# Patient Record
Sex: FEMALE | Race: WHITE
[De-identification: names, ages, dates, MRNs, and addresses within clinical notes are randomized per-mention and may not be internally consistent; named-entity substitution may affect disease eponyms.]

---

## 2018-01-25 ENCOUNTER — HOSPITAL ENCOUNTER (EMERGENCY)
Dept: HOSPITAL 45 - C.EDB | Age: 28
LOS: 1 days | Discharge: HOME | End: 2018-01-26
Payer: SELF-PAY

## 2018-01-25 VITALS
BODY MASS INDEX: 28.43 KG/M2 | HEIGHT: 60.98 IN | WEIGHT: 150.58 LBS | HEIGHT: 60.98 IN | WEIGHT: 150.58 LBS | BODY MASS INDEX: 28.43 KG/M2

## 2018-01-25 VITALS — TEMPERATURE: 98.6 F

## 2018-01-25 DIAGNOSIS — N30.00: ICD-10-CM

## 2018-01-25 DIAGNOSIS — R11.10: Primary | ICD-10-CM

## 2018-01-25 DIAGNOSIS — F17.200: ICD-10-CM

## 2018-01-25 DIAGNOSIS — Z3A.15: ICD-10-CM

## 2018-01-25 LAB
ALBUMIN SERPL-MCNC: 3.2 GM/DL (ref 3.4–5)
ALP SERPL-CCNC: 49 U/L (ref 45–117)
ALT SERPL-CCNC: 16 U/L (ref 12–78)
AST SERPL-CCNC: 9 U/L (ref 15–37)
BASOPHILS # BLD: 0.01 K/UL (ref 0–0.2)
BASOPHILS NFR BLD: 0.1 %
BUN SERPL-MCNC: 13 MG/DL (ref 7–18)
CALCIUM SERPL-MCNC: 9.2 MG/DL (ref 8.5–10.1)
CO2 SERPL-SCNC: 22 MMOL/L (ref 21–32)
CREAT SERPL-MCNC: 0.44 MG/DL (ref 0.6–1.2)
EOS ABS #: 0.08 K/UL (ref 0–0.5)
EOSINOPHIL NFR BLD AUTO: 236 K/UL (ref 130–400)
GLUCOSE SERPL-MCNC: 78 MG/DL (ref 70–99)
HCT VFR BLD CALC: 36.8 % (ref 37–47)
HGB BLD-MCNC: 13 G/DL (ref 12–16)
IG#: 0.06 K/UL (ref 0–0.02)
IMM GRANULOCYTES NFR BLD AUTO: 20.4 %
INR PPP: 0.9 (ref 0.9–1.1)
LYMPHOCYTES # BLD: 2.52 K/UL (ref 1.2–3.4)
MCH RBC QN AUTO: 31.6 PG (ref 25–34)
MCHC RBC AUTO-ENTMCNC: 35.3 G/DL (ref 32–36)
MCV RBC AUTO: 89.3 FL (ref 80–100)
MONO ABS #: 0.43 K/UL (ref 0.11–0.59)
MONOCYTES NFR BLD: 3.5 %
NEUT ABS #: 9.24 K/UL (ref 1.4–6.5)
NEUTROPHILS # BLD AUTO: 0.6 %
NEUTROPHILS NFR BLD AUTO: 74.9 %
PMV BLD AUTO: 9.6 FL (ref 7.4–10.4)
POTASSIUM SERPL-SCNC: 3.7 MMOL/L (ref 3.5–5.1)
PROT SERPL-MCNC: 7 GM/DL (ref 6.4–8.2)
PTT PATIENT: 27.5 SECONDS (ref 21–31)
RED CELL DISTRIBUTION WIDTH CV: 13.3 % (ref 11.5–14.5)
RED CELL DISTRIBUTION WIDTH SD: 43.5 FL (ref 36.4–46.3)
SODIUM SERPL-SCNC: 133 MMOL/L (ref 136–145)
WBC # BLD AUTO: 12.34 K/UL (ref 4.8–10.8)

## 2018-01-26 VITALS — DIASTOLIC BLOOD PRESSURE: 57 MMHG | OXYGEN SATURATION: 97 % | SYSTOLIC BLOOD PRESSURE: 103 MMHG | HEART RATE: 79 BPM

## 2018-01-26 VITALS — OXYGEN SATURATION: 97 %

## 2018-01-26 NOTE — DIAGNOSTIC IMAGING REPORT
ULTRASOUND FETAL LIMITED



CLINICAL HISTORY: Pregnant. Cramping.



COMPARISON STUDY: No priors.



FINDINGS: Real-time, grayscale, and color Doppler sonography of the gravid

uterus is performed. There is a single live uterine gestation with an estimated

heart rate of 143 bpm. The placenta is posterior and normal as imaged. Position

was variable throughout the examination. The cervix was not well visualized. The

femoral length measures 1.42 cm, corresponding to estimated age of 14 weeks one

day. The amniotic fluid volume is grossly normal.



IMPRESSION: There is a single live uterine gestation with an estimated age of 14

weeks one day by femoral length measurement. Note that this does not constitute

a dedicated anatomic scan.







Dictated:  1/26/2018 7:39 AM

Transcribed:  1/26/2018 8:06 AM

Jace







Electronically signed by:  Juancarlos Zambrano M.D.

1/26/2018 8:12 AM



Dictated Date/Time:  1/26/2018 7:39 AM

## 2018-01-26 NOTE — EMERGENCY ROOM VISIT NOTE
History


Report prepared by Pierce:  Supriya Huynh


Under the Supervision of:  Dr. Venkatesh Aguilar M.D.


First contact with patient:  22:39


Chief Complaint:  VOMITING


Stated Complaint:  SEVERE CRAMPING W/VOMITING, MIGRAINE, 15WKS PREG


Nursing Triage Summary:  


patient is 15 weeks pregnant and has been experiencing cramping. OBGYN not 


concerned because no bleeding. patient now is vomiting and cramping with 


migraines





History of Present Illness


The patient is a 27 year old female who presents to the Emergency Room with 

complaints of persistent abdominal cramping starting 2 weeks ago. The patient 

is currently 15 weeks pregnant. This is her 4th pregnancy. She has not had any 

miscarriages. She is not having any bleeding. She reports daily migraines, back 

pain, and projectile vomiting. She saw some blood in her vomit. She is seeing 

black spots in her vision with the migraines. She has a history of migraines, 

but has never had them daily. She currently does not have a migraine, but does 

feel a pressure in her head. She denies any recent trauma.





   Source of History:  patient


   Onset:  2 weeks ago


   Position:  abdomen


   Quality:  cramping


   Timing:  other (persistent)


   Associated Symptoms:  + headache, + vomiting, + back pain





Review of Systems


See HPI for pertinent positives & negatives. A total of 10 systems reviewed and 

were otherwise negative.





Past Medical & Surgical


Medical Problems:


(1) Migraine








Family History


No pertinent family history stated.





Social History


Smoking Status:  Current Every Day Smoker


Housing Status:  lives with family





Current/Historical Medications


Scheduled


Cephalexin Monohydrate (Keflex), 1 CAP PO QID


Ondasetron Odt (Zofran Odt), 4 MG SL Q6H


Pediatric Multiple Vitamin W/ (Flintstones Chewable), 2 TAB PO QAM





Allergies


Coded Allergies:  


     No Known Allergies (Unverified , 1/26/18)





Physical Exam


Vital Signs











  Date Time  Temp Pulse Resp B/P (MAP) Pulse Ox O2 Delivery O2 Flow Rate FiO2


 


1/26/18 01:01     97 Room Air  


 


1/26/18 01:00  79 16 103/57 97 Room Air  


 


1/25/18 23:36  89 16 115/69 100 Room Air  


 


1/25/18 21:49 37.0 94 20 127/59 97 Room Air  











Physical Exam


GENERAL: Patient is a healthy-appearing well-nourished female


HEAD: Normocephalic atraumatic


EYES: Ocular movements intact pupils equal and react to light


OROPHARYNX mucous membranes are moist no exudates present no erythema or edema 

present


NECK: Supple no nuchal rigidity


CHEST: Good equal expansion


LUNGS: Clear and equal to auscultation


CARDIAC: Normal S1 and S2


ABDOMEN: Soft nontender no guarding


BACK: No CVA tenderness


EXTREMITIES: No pain upon palpation normal muscle strength in all groups no 

clubbing cyanosis or edema


NEURO: Patient is following commands and answering questions appropriately. 

Alert and oriented x3 Cranial Nerves 2-12 grossly intact





Medical Decision & Procedures


ER Provider


Diagnostic Interpretation:


Radiology results as stated below per my review and [facility] radiologist 

interpretation:





US OB Limited:


Single live IUP measuring consistent with 15 weeks 0 days. Fetal heart rate 142 

beats per minute. Variable positioning noted. Placenta is posterior without 

previa. Cervix not visualized. Amniotic fluid volume subjectively within normal 

limits.





Laboratory Results


1/25/18 22:58








Red Blood Count 4.12, Mean Corpuscular Volume 89.3, Mean Corpuscular Hemoglobin 

31.6, Mean Corpuscular Hemoglobin Concent 35.3, Mean Platelet Volume 9.6, 

Neutrophils (%) (Auto) 74.9, Lymphocytes (%) (Auto) 20.4, Monocytes (%) (Auto) 

3.5, Eosinophils (%) (Auto) 0.6, Basophils (%) (Auto) 0.1, Neutrophils # (Auto) 

9.24, Lymphocytes # (Auto) 2.52, Monocytes # (Auto) 0.43, Eosinophils # (Auto) 

0.08, Basophils # (Auto) 0.01





1/25/18 22:58

















Test


  1/25/18


22:55 1/25/18


22:58


 


Urine Color YELLOW  


 


Urine Appearance CLEAR (CLEAR)  


 


Urine pH 5.5 (4.5-7.5)  


 


Urine Specific Gravity


  1.026


(1.000-1.030) 


 


 


Urine Protein NEG (NEG)  


 


Urine Glucose (UA) NEG (NEG)  


 


Urine Ketones 2+ (NEG)  


 


Urine Occult Blood 1+ (NEG)  


 


Urine Nitrite POS (NEG)  


 


Urine Bilirubin NEG (NEG)  


 


Urine Urobilinogen NEG (NEG)  


 


Urine Leukocyte Esterase TRACE (NEG)  


 


Urine WBC (Auto)


  5-10 /hpf


(0-5) 


 


 


Urine RBC (Auto) 0-4 /hpf (0-4)  


 


Urine Hyaline Casts (Auto)


  5-10 /lpf


(0-5) 


 


 


Urine Epithelial Cells (Auto) >30 /lpf (0-5)  


 


Urine Bacteria (Auto) 4+ (NEG)  


 


Urine Pregnancy Test POS (NEG)  


 


White Blood Count


  


  12.34 K/uL


(4.8-10.8)


 


Red Blood Count


  


  4.12 M/uL


(4.2-5.4)


 


Hemoglobin


  


  13.0 g/dL


(12.0-16.0)


 


Hematocrit  36.8 % (37-47) 


 


Mean Corpuscular Volume


  


  89.3 fL


()


 


Mean Corpuscular Hemoglobin


  


  31.6 pg


(25-34)


 


Mean Corpuscular Hemoglobin


Concent 


  35.3 g/dl


(32-36)


 


Platelet Count


  


  236 K/uL


(130-400)


 


Mean Platelet Volume


  


  9.6 fL


(7.4-10.4)


 


Neutrophils (%) (Auto)  74.9 % 


 


Lymphocytes (%) (Auto)  20.4 % 


 


Monocytes (%) (Auto)  3.5 % 


 


Eosinophils (%) (Auto)  0.6 % 


 


Basophils (%) (Auto)  0.1 % 


 


Neutrophils # (Auto)


  


  9.24 K/uL


(1.4-6.5)


 


Lymphocytes # (Auto)


  


  2.52 K/uL


(1.2-3.4)


 


Monocytes # (Auto)


  


  0.43 K/uL


(0.11-0.59)


 


Eosinophils # (Auto)


  


  0.08 K/uL


(0-0.5)


 


Basophils # (Auto)


  


  0.01 K/uL


(0-0.2)


 


RDW Standard Deviation


  


  43.5 fL


(36.4-46.3)


 


RDW Coefficient of Variation


  


  13.3 %


(11.5-14.5)


 


Immature Granulocyte % (Auto)  0.5 % 


 


Immature Granulocyte # (Auto)


  


  0.06 K/uL


(0.00-0.02)


 


Prothrombin Time


  


  9.5 SECONDS


(9.0-12.0)


 


Prothromb Time International


Ratio 


  0.9 (0.9-1.1) 


 


 


Activated Partial


Thromboplast Time 


  27.5 SECONDS


(21.0-31.0)


 


Partial Thromboplastin Ratio  1.1 


 


Anion Gap


  


  8.0 mmol/L


(3-11)


 


Est Creatinine Clear Calc


Drug Dose 


  166.7 ml/min 


 


 


Estimated GFR (


American) 


  > 150.0 


 


 


Estimated GFR (Non-


American 


  136.4 


 


 


BUN/Creatinine Ratio  28.3 (10-20) 


 


Calcium Level


  


  9.2 mg/dl


(8.5-10.1)


 


Total Bilirubin


  


  0.3 mg/dl


(0.2-1)


 


Aspartate Amino Transf


(AST/SGOT) 


  9 U/L (15-37) 


 


 


Alanine Aminotransferase


(ALT/SGPT) 


  16 U/L (12-78) 


 


 


Alkaline Phosphatase


  


  49 U/L


()


 


Total Protein


  


  7.0 gm/dl


(6.4-8.2)


 


Albumin


  


  3.2 gm/dl


(3.4-5.0)


 


Globulin


  


  3.8 gm/dl


(2.5-4.0)


 


Albumin/Globulin Ratio  0.8 (0.9-2) 


 


Human Chorionic Gonadotropin,


Quant 


  48821 mIU/mL 


 





Labs reviewed by ED physician.





Medications Administered











 Medications


  (Trade)  Dose


 Ordered  Sig/Jeanette


 Route  Start Time


 Stop Time Status Last Admin


Dose Admin


 


 Sodium Chloride  1,000 ml @ 


 999 mls/hr  Q1H1M STAT


 IV  1/25/18 22:53


 1/25/18 23:53 DC 1/25/18 23:25


999 MLS/HR


 


 Ondansetron HCl


  (Zofran Inj)  4 mg  NOW  STAT


 IV  1/25/18 22:53


 1/25/18 22:55 DC 1/25/18 23:25


4 MG


 


 Ceftriaxone Sodium


  (Rocephin Inj)  1 gm  NOW  STAT


 IV  1/25/18 23:55


 1/25/18 23:57 DC 1/26/18 00:56


1 GM











ED Course


2245: Past medical records reviewed. The patient was evaluated in room C3. A 

complete history and physical examination was performed. 





2253: Zofran Inj 4 mg IV, NSS 1000 ml @ 999 mls/hr IV.





2355: Rocephin Inj 1 gm IV. 





0107: Upon reexamination the patient is resting comfortably. I discussed 

results and treatment plan with the patient. She verbalizes agreement and 

understanding. The patient is ready for discharge.





Medical Decision


Differential diagnosis:


Etiologies such as appendicitis, diverticulitis, PUD, biliary pathology, UTI, 

pancreatitis, obstruction, mesenteric ischemia, aortic pathology, infections, 

inflammatory bowel disease, renal colic, as well as others were entertained.  

This is a 27-year-old female who presents emergency department complaining of 

abdominal cramping along with vomiting.  The patient is pregnant.  An IV was 

established, patient given normal saline bolus.  The patient was started on 

Rocephin for what appears to be urinary tract infection.  She was also given 

Zofran as well as Tylenol.  Repeat examination revealed improvement the patient'

s symptoms.  I do feel the patient as well as to be discharged home pending 

urine culture results.  Patient was in agreement with the treatment plan.





Medication Reconcilliation


Current Medication List:  was personally reviewed by me





Blood Pressure Screening


Patient's blood pressure:  Normal blood pressure


Blood pressure disposition:  Did not require urgent referral





Impression





 Primary Impression:  


 Vomiting


 Additional Impressions:  


 Pregnancy


 UTI (urinary tract infection)





Scribe Attestation


The scribe's documentation has been prepared under my direction and personally 

reviewed by me in its entirety. I confirm that the note above accurately 

reflects all work, treatment, procedures, and medical decision making performed 

by me.





Departure Information


Dispostion


Home / Self-Care





Prescriptions





Ondasetron Odt (ZOFRAN ODT) 4 Mg Tab


4 MG SL Q6H for Nausea, #6 TAB


   Prov: Venkatesh Aguilar MD         1/26/18 


Cephalexin Monohydrate (Keflex) 500 Mg Cap


1 CAP PO QID for 10 Days, #40 CAP


   Prov: Venkatesh Aguilar MD         1/26/18





Referrals


No Doctor, Assigned (PCP)





Forms


HOME CARE DOCUMENTATION FORM,                                                 

               IMPORTANT VISIT INFORMATION





Patient Instructions


ED UTI Cystitis Female, My [facility], Preg 2nd Trimester, Preg 2nd 

Trimester Coping, Preg Comfort Tips





Additional Instructions





Follow up with Dr Paniagua's office





Increase fluids next 48 hours














You have been examined and treated today on an emergency basis only. This is 

not a substitute for, or an effort to provide, complete comprehensive medical 

care. It is impossible to recognize and treat all injuries or illnesses in a 

single emergency department visit. It is therefore important that you follow up 

closely with your PCP.  Call as soon as possible for an appointment.  





Thank you for your time and consideration.  I look forward to speaking with you 

again soon.  Please don't hesitate to call us if you have any questions.





Problem Qualifiers








 Primary Impression:  


 Vomiting


 Vomiting type:  unspecified  Vomiting Intractability:  unspecified  Nausea 

presence:  unspecified  Qualified Codes:  R11.10 - Vomiting, unspecified


 Additional Impressions:  


 Pregnancy


 Weeks of gestation:  15 weeks  Qualified Codes:  Z3A.15 - 15 weeks gestation 

of pregnancy


 UTI (urinary tract infection)


 Urinary tract infection type:  acute cystitis  Hematuria presence:  without 

hematuria  Qualified Codes:  N30.00 - Acute cystitis without hematuria